# Patient Record
Sex: MALE | Race: WHITE | Employment: UNEMPLOYED | URBAN - NONMETROPOLITAN AREA
[De-identification: names, ages, dates, MRNs, and addresses within clinical notes are randomized per-mention and may not be internally consistent; named-entity substitution may affect disease eponyms.]

---
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
## 2024-01-30 ENCOUNTER — HOSPITAL ENCOUNTER (EMERGENCY)
Age: 19
Discharge: HOME | End: 2024-01-30

## 2024-01-30 VITALS
SYSTOLIC BLOOD PRESSURE: 98 MMHG | WEIGHT: 139.6 LBS | DIASTOLIC BLOOD PRESSURE: 68 MMHG | HEART RATE: 70 BPM | OXYGEN SATURATION: 99 % | RESPIRATION RATE: 14 BRPM | TEMPERATURE: 99 F

## 2024-01-30 DIAGNOSIS — Z20.2 EXPOSURE TO STD: Primary | ICD-10-CM

## 2024-01-30 LAB
BILIRUB UR STRIP.AUTO-MCNC: NEGATIVE MG/DL
CHARACTER UR: CLEAR
COLOR: YELLOW
GLUCOSE UR QL STRIP.AUTO: NEGATIVE MG/DL
KETONES UR QL STRIP.AUTO: NEGATIVE
NITRITE UR QL STRIP.AUTO: NEGATIVE
PH UR STRIP.AUTO: 5.5 [PH] (ref 5–9)
PROT UR STRIP.AUTO-MCNC: NEGATIVE MG/DL
RBC #/AREA URNS HPF: NEGATIVE /[HPF]
SP GR UR STRIP.AUTO: 1.02 (ref 1–1.03)
UROBILINOGEN, URINE: 0.2 EU/DL (ref 0.2–1)
WBC #/AREA URNS HPF: ABNORMAL /[HPF]

## 2024-01-30 PROCEDURE — 87491 CHLMYD TRACH DNA AMP PROBE: CPT

## 2024-01-30 PROCEDURE — 99214 OFFICE O/P EST MOD 30 MIN: CPT

## 2024-01-30 PROCEDURE — 87591 N.GONORRHOEAE DNA AMP PROB: CPT

## 2024-01-30 PROCEDURE — 96372 THER/PROPH/DIAG INJ SC/IM: CPT

## 2024-01-30 PROCEDURE — 6360000002 HC RX W HCPCS

## 2024-01-30 PROCEDURE — 81003 URINALYSIS AUTO W/O SCOPE: CPT

## 2024-01-30 PROCEDURE — 2500000003 HC RX 250 WO HCPCS

## 2024-01-30 PROCEDURE — 99213 OFFICE O/P EST LOW 20 MIN: CPT

## 2024-01-30 RX ORDER — DOXYCYCLINE HYCLATE 100 MG
100 TABLET ORAL 2 TIMES DAILY
Qty: 14 TABLET | Refills: 0 | Status: SHIPPED | OUTPATIENT
Start: 2024-01-30 | End: 2024-02-06

## 2024-01-30 RX ADMIN — LIDOCAINE HYDROCHLORIDE 500 MG: 10 INJECTION, SOLUTION EPIDURAL; INFILTRATION; INTRACAUDAL; PERINEURAL at 16:25

## 2024-01-30 ASSESSMENT — ENCOUNTER SYMPTOMS
DIARRHEA: 0
ABDOMINAL PAIN: 0
NAUSEA: 0
VOMITING: 0

## 2024-01-30 ASSESSMENT — PAIN SCALES - GENERAL: PAINLEVEL_OUTOF10: 6

## 2024-01-30 ASSESSMENT — PAIN DESCRIPTION - LOCATION: LOCATION: PENIS

## 2024-01-30 ASSESSMENT — PAIN - FUNCTIONAL ASSESSMENT
PAIN_FUNCTIONAL_ASSESSMENT: ACTIVITIES ARE NOT PREVENTED
PAIN_FUNCTIONAL_ASSESSMENT: 0-10

## 2024-01-30 ASSESSMENT — PAIN DESCRIPTION - FREQUENCY: FREQUENCY: INTERMITTENT

## 2024-01-30 NOTE — ED TRIAGE NOTES
Andrew arrives to room with complaint of  burning with urination for past 3 months.    Girlfriend positive for chlamydia.    Pt would like all STD testing.     Work note

## 2024-01-30 NOTE — ED PROVIDER NOTES
[facility] URGENT CARE  Urgent Care Encounter      CHIEF COMPLAINT       Chief Complaint   Patient presents with    burning with urination       Nurses Notes reviewed and I agree except as noted in the HPI.  HISTORY OF PRESENT ILLNESS   Andrew Hernández is a 18 y.o. male who presents to urgent care with complaints of concern for STI.  Patient reports his girlfriend tested positive for chlamydia and he has been having unprotected intercourse with her recently.  Patient reports he has had some mild burning with urination.  Patient denies pelvic pain, fever, penile discharge.  Patient reports he would like treated today.    REVIEW OF SYSTEMS     Review of Systems   Constitutional:  Negative for fever.   HENT:  Negative for congestion.    Gastrointestinal:  Negative for abdominal pain, diarrhea, nausea and vomiting.   Genitourinary:  Positive for dysuria. Negative for penile discharge, penile pain, penile swelling, scrotal swelling and testicular pain.   Neurological:  Negative for dizziness, numbness and headaches.   All other systems reviewed and are negative.      PAST MEDICAL HISTORY         Diagnosis Date    Patient denies medical problems        SURGICAL HISTORY     Patient  has no past surgical history on file.    CURRENT MEDICATIONS       Previous Medications    No medications on file       ALLERGIES     Patient is has No Known Allergies.    FAMILY HISTORY     Patient'sfamily history is not on file.    SOCIAL HISTORY     Patient  reports that he has never smoked. He has never used smokeless tobacco. He reports that he does not currently use alcohol. He reports that he does not use drugs.    PHYSICAL EXAM     ED TRIAGE VITALS  BP: 131/71, Temp: 99 °F (37.2 °C), Pulse: 73, Respirations: 14, SpO2: 98 %  Physical Exam  Vitals and nursing note reviewed.   Constitutional:       Appearance: Normal appearance.   HENT:      Head: Normocephalic and atraumatic.      Nose: Nose normal.   Eyes:      Pupils:

## 2024-01-31 LAB
CHLAMYDIA TRACHOMATIS BY RT-PCR: DETECTED
CT/NG SOURCE: ABNORMAL
NEISSERIA GONORRHOEAE BY RT-PCR: NOT DETECTED

## 2025-05-13 ENCOUNTER — HOSPITAL ENCOUNTER (EMERGENCY)
Age: 20
Discharge: HOME | End: 2025-05-13

## 2025-05-13 VITALS
SYSTOLIC BLOOD PRESSURE: 109 MMHG | OXYGEN SATURATION: 97 % | HEART RATE: 67 BPM | RESPIRATION RATE: 16 BRPM | TEMPERATURE: 98.1 F | DIASTOLIC BLOOD PRESSURE: 65 MMHG

## 2025-05-13 DIAGNOSIS — N30.00 ACUTE CYSTITIS WITHOUT HEMATURIA: Primary | ICD-10-CM

## 2025-05-13 LAB
BILIRUB UR STRIP.AUTO-MCNC: NEGATIVE MG/DL
CHARACTER UR: CLEAR
COLOR, UA: YELLOW
GLUCOSE UR QL STRIP.AUTO: NEGATIVE MG/DL
KETONES UR QL STRIP.AUTO: NEGATIVE
NITRITE UR QL STRIP.AUTO: NEGATIVE
PH UR STRIP.AUTO: 6 [PH] (ref 5–9)
PROT UR STRIP.AUTO-MCNC: ABNORMAL MG/DL
RBC #/AREA URNS HPF: ABNORMAL /[HPF]
SP GR UR STRIP.AUTO: 1.02 (ref 1–1.03)
UROBILINOGEN, URINE: 0.2 EU/DL (ref 0.2–1)
WBC #/AREA URNS HPF: NEGATIVE /[HPF]

## 2025-05-13 PROCEDURE — 87086 URINE CULTURE/COLONY COUNT: CPT

## 2025-05-13 PROCEDURE — 87491 CHLMYD TRACH DNA AMP PROBE: CPT

## 2025-05-13 PROCEDURE — 99214 OFFICE O/P EST MOD 30 MIN: CPT | Performed by: NURSE PRACTITIONER

## 2025-05-13 PROCEDURE — 87591 N.GONORRHOEAE DNA AMP PROB: CPT

## 2025-05-13 PROCEDURE — 81003 URINALYSIS AUTO W/O SCOPE: CPT

## 2025-05-13 PROCEDURE — 99213 OFFICE O/P EST LOW 20 MIN: CPT

## 2025-05-13 RX ORDER — CEPHALEXIN 500 MG/1
500 CAPSULE ORAL 3 TIMES DAILY
Qty: 21 CAPSULE | Refills: 0 | Status: SHIPPED | OUTPATIENT
Start: 2025-05-13 | End: 2025-05-20

## 2025-05-13 ASSESSMENT — ENCOUNTER SYMPTOMS
VOMITING: 0
CHEST TIGHTNESS: 0
APNEA: 0
STRIDOR: 0
NAUSEA: 0
CHOKING: 0
WHEEZING: 0
COUGH: 0
SHORTNESS OF BREATH: 0

## 2025-05-13 ASSESSMENT — PAIN SCALES - GENERAL: PAINLEVEL_OUTOF10: 9

## 2025-05-13 ASSESSMENT — PAIN - FUNCTIONAL ASSESSMENT: PAIN_FUNCTIONAL_ASSESSMENT: 0-10

## 2025-05-13 NOTE — ED PROVIDER NOTES
[facility] URGENT CARE  Urgent Care Encounter      CHIEF COMPLAINT       Chief Complaint   Patient presents with    Dysuria       Nurses Notes reviewed and I agree except as noted in the HPI.  HISTORY OFPRESENT ILLNESS   Andrew Hernández is a 19 y.o.  The history is provided by the patient and a friend. No  was used.   Dysuria   This is a new problem. The current episode started more than 2 days ago. The problem occurs every urination. The problem has been gradually worsening. The quality of the pain is described as stabbing. The pain is at a severity of 5/10. The pain is moderate. There has been no fever. He is Sexually active. There is No history of pyelonephritis. Associated symptoms include hesitancy. Pertinent negatives include no chills, no sweats, no nausea, no vomiting, no discharge, no frequency, no hematuria, no possible pregnancy, no urgency and no flank pain. He has tried nothing for the symptoms. His past medical history does not include kidney stones, single kidney, urological procedure, recurrent UTIs, urinary stasis or catheterization.       REVIEW OF SYSTEMS     Review of Systems   Constitutional:  Negative for chills.   Respiratory:  Negative for apnea, cough, choking, chest tightness, shortness of breath, wheezing and stridor.    Cardiovascular:  Negative for chest pain, palpitations and leg swelling.   Gastrointestinal:  Negative for nausea and vomiting.   Genitourinary:  Positive for difficulty urinating, dysuria and hesitancy. Negative for enuresis, flank pain, frequency, genital sores, hematuria, penile discharge, penile pain, penile swelling, scrotal swelling, testicular pain and urgency.       PAST MEDICAL HISTORY         Diagnosis Date    Patient denies medical problems        SURGICAL HISTORY     Patient  has no past surgical history on file.    CURRENT MEDICATIONS       Discharge Medication List as of 5/13/2025  2:23 PM          ALLERGIES     Patient is has no known

## 2025-05-14 LAB
CHLAMYDIA DNA UR QL NAA+PROBE: NEGATIVE
CHLAMYDIA, GC DNA AMP, URINE: NORMAL
N GONORRHOEA DNA UR QL NAA+PROBE: NEGATIVE

## 2025-05-15 LAB — BACTERIA UR CULT: NORMAL

## 2025-06-24 ENCOUNTER — HOSPITAL ENCOUNTER (EMERGENCY)
Age: 20
Discharge: HOME | End: 2025-06-24

## 2025-06-24 VITALS
TEMPERATURE: 97.9 F | HEART RATE: 73 BPM | RESPIRATION RATE: 16 BRPM | SYSTOLIC BLOOD PRESSURE: 107 MMHG | OXYGEN SATURATION: 98 % | DIASTOLIC BLOOD PRESSURE: 66 MMHG

## 2025-06-24 DIAGNOSIS — R36.9 PENILE DISCHARGE: Primary | ICD-10-CM

## 2025-06-24 PROCEDURE — 99213 OFFICE O/P EST LOW 20 MIN: CPT | Performed by: NURSE PRACTITIONER

## 2025-06-24 PROCEDURE — 6360000002 HC RX W HCPCS: Performed by: NURSE PRACTITIONER

## 2025-06-24 PROCEDURE — 99213 OFFICE O/P EST LOW 20 MIN: CPT

## 2025-06-24 PROCEDURE — 87491 CHLMYD TRACH DNA AMP PROBE: CPT

## 2025-06-24 PROCEDURE — 87591 N.GONORRHOEAE DNA AMP PROB: CPT

## 2025-06-24 PROCEDURE — 96372 THER/PROPH/DIAG INJ SC/IM: CPT

## 2025-06-24 RX ORDER — DOXYCYCLINE HYCLATE 100 MG
100 TABLET ORAL 2 TIMES DAILY
Qty: 14 TABLET | Refills: 0 | Status: SHIPPED | OUTPATIENT
Start: 2025-06-24 | End: 2025-07-01

## 2025-06-24 RX ADMIN — LIDOCAINE HYDROCHLORIDE 500 MG: 10 INJECTION, SOLUTION EPIDURAL; INFILTRATION; INTRACAUDAL; PERINEURAL at 09:15

## 2025-06-24 ASSESSMENT — ENCOUNTER SYMPTOMS
DIARRHEA: 0
SORE THROAT: 0
NAUSEA: 0
VOMITING: 0
SHORTNESS OF BREATH: 0
CHEST TIGHTNESS: 0
RHINORRHEA: 0
COUGH: 0

## 2025-06-24 NOTE — ED PROVIDER NOTES
[facility] URGENT CARE  Urgent Care Encounter       CHIEF COMPLAINT       Chief Complaint   Patient presents with    Penile Discharge       Onset 2 days  green drainage       Nurses Notes reviewed and I agree except as noted in the HPI.  HISTORY OF PRESENT ILLNESS   Andrew Hernández is a 20 y.o. male who presents to the [place] urgent care for evaluation of penile discharge.  He reports symptoms started roughly 2 days ago.  He does report that the discharge is green in color.  He denies high risk sexual behavior.  Does report that he slept with someone different roughly 1 month ago.  He denies dysuria, urgency, frequency.  Does appear to have been positive for chlamydia in the past.    The history is provided by the patient. No  was used.       REVIEW OF SYSTEMS     Review of Systems   Constitutional:  Negative for activity change, appetite change, chills, fatigue and fever.   HENT:  Negative for ear discharge, ear pain, rhinorrhea and sore throat.    Respiratory:  Negative for cough, chest tightness and shortness of breath.    Cardiovascular:  Negative for chest pain.   Gastrointestinal:  Negative for diarrhea, nausea and vomiting.   Genitourinary:  Positive for penile discharge. Negative for dysuria.   Skin:  Negative for rash.   Allergic/Immunologic: Negative for environmental allergies and food allergies.   Neurological:  Negative for dizziness and headaches.       PAST MEDICAL HISTORY         Diagnosis Date    Patient denies medical problems        SURGICALHISTORY     Patient  has no past surgical history on file.    CURRENT MEDICATIONS       Previous Medications    No medications on file       ALLERGIES     Patient is has no known allergies.    Patients   There is no immunization history on file for this patient.    FAMILY HISTORY     Patient's family history is not on file.    SOCIAL HISTORY     Patient  reports that he has never smoked. He has never used smokeless tobacco. He

## 2025-06-25 LAB
CHLAMYDIA DNA UR QL NAA+PROBE: NEGATIVE
CHLAMYDIA, GC DNA AMP, URINE: ABNORMAL
N GONORRHOEA DNA UR QL NAA+PROBE: ABNORMAL